# Patient Record
Sex: MALE | Race: WHITE
[De-identification: names, ages, dates, MRNs, and addresses within clinical notes are randomized per-mention and may not be internally consistent; named-entity substitution may affect disease eponyms.]

---

## 2020-09-01 ENCOUNTER — HOSPITAL ENCOUNTER (EMERGENCY)
Dept: HOSPITAL 26 - MED | Age: 50
Discharge: HOME | End: 2020-09-01
Payer: MEDICAID

## 2020-09-01 VITALS — DIASTOLIC BLOOD PRESSURE: 89 MMHG | SYSTOLIC BLOOD PRESSURE: 154 MMHG

## 2020-09-01 VITALS — WEIGHT: 236 LBS | BODY MASS INDEX: 37.04 KG/M2 | HEIGHT: 67 IN

## 2020-09-01 VITALS — SYSTOLIC BLOOD PRESSURE: 142 MMHG | DIASTOLIC BLOOD PRESSURE: 78 MMHG

## 2020-09-01 DIAGNOSIS — N50.9: Primary | ICD-10-CM

## 2020-09-01 DIAGNOSIS — R11.10: ICD-10-CM

## 2020-09-01 PROCEDURE — 99284 EMERGENCY DEPT VISIT MOD MDM: CPT

## 2020-09-01 PROCEDURE — 93005 ELECTROCARDIOGRAM TRACING: CPT

## 2020-09-01 PROCEDURE — 96372 THER/PROPH/DIAG INJ SC/IM: CPT

## 2020-09-01 PROCEDURE — 76870 US EXAM SCROTUM: CPT

## 2020-09-01 NOTE — NUR
Patient discharged with v/s stable. Written and verbal after care instructions 
given and explained. 

Patient alert, oriented and verbalized understanding of instructions. 
Ambulatory with steady gait. All questions addressed prior to discharge. ID 
band removed. Patient advised to follow up with PMD. Rx of NORCO, 
CIPROFLOXACIN, ZOFRAN given. Patient educated on indication of medication 
including possible reaction and side effects. Opportunity to ask questions 
provided and answered.

## 2020-09-01 NOTE — NUR
51 Y/O MALE C/O EPIGASTRIC PAIN THAT BEGAN TODAY. C/O NAUSEA, DENIES ANY 
VOMITING OR DIARRHEA. DENIES ANY CHEST PAIN, SOB/ COUGH. AFEBRILE AT THIS TIME. 
AAOX4. BOWEL SOUNDS NORMOACTIVE. ABD SOFT/ROUND/NON DISTENDED. BOWEL SOUNDS 
NORMOACTIVE.